# Patient Record
Sex: MALE | Race: WHITE
[De-identification: names, ages, dates, MRNs, and addresses within clinical notes are randomized per-mention and may not be internally consistent; named-entity substitution may affect disease eponyms.]

---

## 2018-04-04 ENCOUNTER — HOSPITAL ENCOUNTER (EMERGENCY)
Dept: HOSPITAL 83 - ED | Age: 48
Discharge: HOME | End: 2018-04-04
Payer: COMMERCIAL

## 2018-04-04 VITALS — WEIGHT: 200 LBS | HEIGHT: 71.97 IN | BODY MASS INDEX: 27.09 KG/M2

## 2018-04-04 DIAGNOSIS — L02.414: Primary | ICD-10-CM

## 2018-04-04 DIAGNOSIS — Z79.899: ICD-10-CM

## 2018-11-29 ENCOUNTER — HOSPITAL ENCOUNTER (EMERGENCY)
Age: 48
Discharge: HOME | End: 2018-11-29
Payer: COMMERCIAL

## 2018-11-29 VITALS
HEART RATE: 85 BPM | WEIGHT: 200 LBS | DIASTOLIC BLOOD PRESSURE: 57 MMHG | OXYGEN SATURATION: 98 % | SYSTOLIC BLOOD PRESSURE: 125 MMHG | TEMPERATURE: 97.9 F | RESPIRATION RATE: 16 BRPM | BODY MASS INDEX: 27.09 KG/M2 | HEIGHT: 72 IN

## 2018-11-29 DIAGNOSIS — L02.91 ABSCESS: Primary | ICD-10-CM

## 2018-11-29 PROCEDURE — 99282 EMERGENCY DEPT VISIT SF MDM: CPT

## 2018-11-29 PROCEDURE — 87070 CULTURE OTHR SPECIMN AEROBIC: CPT

## 2018-11-29 PROCEDURE — 87205 SMEAR GRAM STAIN: CPT

## 2018-11-29 PROCEDURE — 87077 CULTURE AEROBIC IDENTIFY: CPT

## 2018-11-29 RX ORDER — IBUPROFEN 800 MG/1
800 TABLET ORAL EVERY 6 HOURS PRN
Qty: 16 TABLET | Refills: 0 | Status: SHIPPED | OUTPATIENT
Start: 2018-11-29 | End: 2018-12-03

## 2018-11-29 RX ORDER — QUETIAPINE FUMARATE 200 MG/1
200 TABLET, FILM COATED ORAL 2 TIMES DAILY
COMMUNITY

## 2018-11-29 RX ORDER — CLINDAMYCIN HYDROCHLORIDE 300 MG/1
300 CAPSULE ORAL 4 TIMES DAILY
Qty: 40 CAPSULE | Refills: 0 | Status: SHIPPED | OUTPATIENT
Start: 2018-11-29 | End: 2018-12-09

## 2018-12-03 LAB
GRAM STAIN RESULT: ABNORMAL
ORGANISM: ABNORMAL
WOUND/ABSCESS: ABNORMAL

## 2023-09-24 ENCOUNTER — HOSPITAL ENCOUNTER (EMERGENCY)
Age: 53
Discharge: HOME | End: 2023-09-24
Payer: MEDICAID

## 2023-09-24 ENCOUNTER — APPOINTMENT (OUTPATIENT)
Dept: GENERAL RADIOLOGY | Age: 53
End: 2023-09-24
Payer: MEDICAID

## 2023-09-24 VITALS
SYSTOLIC BLOOD PRESSURE: 149 MMHG | RESPIRATION RATE: 14 BRPM | HEIGHT: 72 IN | BODY MASS INDEX: 27.09 KG/M2 | WEIGHT: 200 LBS | OXYGEN SATURATION: 100 % | HEART RATE: 93 BPM | TEMPERATURE: 97 F | DIASTOLIC BLOOD PRESSURE: 87 MMHG

## 2023-09-24 DIAGNOSIS — S46.211A RUPTURE OF RIGHT BICEPS TENDON, INITIAL ENCOUNTER: Primary | ICD-10-CM

## 2023-09-24 PROCEDURE — 73060 X-RAY EXAM OF HUMERUS: CPT

## 2023-09-24 PROCEDURE — 99283 EMERGENCY DEPT VISIT LOW MDM: CPT

## 2023-09-24 PROCEDURE — 73070 X-RAY EXAM OF ELBOW: CPT

## 2023-09-24 NOTE — DISCHARGE INSTRUCTIONS
CALL ORTHOPEDIC SURGERY TOMORROW TO SCHEDULE AN APPOINTMENT. ALTERNATE TYLENOL AND IBUPROFEN FOR PAIN. RETURN FOR WORSENING SYMPTOMS.

## 2023-09-27 ENCOUNTER — OFFICE VISIT (OUTPATIENT)
Dept: ORTHOPEDIC SURGERY | Age: 53
End: 2023-09-27

## 2023-09-27 VITALS — WEIGHT: 200 LBS | BODY MASS INDEX: 27.09 KG/M2 | HEIGHT: 72 IN

## 2023-09-27 DIAGNOSIS — S46.211A BICEPS RUPTURE, PROXIMAL, RIGHT, INITIAL ENCOUNTER: ICD-10-CM

## 2023-09-27 DIAGNOSIS — M25.511 ACUTE PAIN OF RIGHT SHOULDER: Primary | ICD-10-CM
